# Patient Record
Sex: MALE | HISPANIC OR LATINO
[De-identification: names, ages, dates, MRNs, and addresses within clinical notes are randomized per-mention and may not be internally consistent; named-entity substitution may affect disease eponyms.]

---

## 2018-06-25 ENCOUNTER — CHARTING TRANS (OUTPATIENT)
Dept: OTHER | Age: 38
End: 2018-06-25

## 2018-11-01 VITALS
BODY MASS INDEX: 32.3 KG/M2 | RESPIRATION RATE: 18 BRPM | HEIGHT: 66 IN | SYSTOLIC BLOOD PRESSURE: 122 MMHG | DIASTOLIC BLOOD PRESSURE: 80 MMHG | HEART RATE: 72 BPM | TEMPERATURE: 98.4 F | WEIGHT: 201 LBS

## 2024-08-01 NOTE — PROGRESS NOTES
SUBJECTIVE:  Alejandro Reyes is a 44 year old male who presents for a consultation at the request of, and a copy of this note will be sent to, N/A, for evaluation of  premature ejaculation. He states that the problem is unchanged. Symptoms include chronic history of premature ejaculation well-controlled with paroxetine 20 mg which he takes daily.  Here for a second opinion.. He denies any other complaints.    Allergies: No Known Allergies    History:  No past medical history on file.   No past surgical history on file.   No family history on file.   Social History:   Social History     Socioeconomic History    Marital status:      Social Determinants of Health     Food Insecurity: No Food Insecurity (7/22/2024)    Received from [facility]    Food Insecurity     Within the past 30 days, I worried whether my food would run out before I got money to buy more. / En los últimos 30 días, me preocupó que la comida se podía acabar antes de tener dinero para compr...: Never true / [place]     Within the past 30 days, the food that I bought just didn't last, and I didn't have money to get more. / En los últimos 30 días, La comida que compré no rindió lo suficiente, y no tenía dinero para...: Never true / [place]            REVIEW OF SYSTEMS:  RESPIRATORY:  Negative for chest tightness, wheezing, cough, shortness of breath,  sputum production,chest pain or pleurisy.  CARDIOVASCULAR:  Negative for pain or chest discomfort, dizziness or fainting, palpitations, leg swelling, nocturia, or claudication.  GASTROINTESTINAL:  Negative for nausea, vomiting, diarrhea, constipation, heartburn or indigestion, abdominal pains, bloody or tarry stools.  GENERAL: Denies:  weight gain, weight loss, fever, night sweats, bone pain, malaise, and fatigue. Positive for:  none.  All other review of systems reviewed and otherwise negative    OBJECTIVE:  /90 (BP Location: Right arm, Patient Position: Sitting, Cuff Size:  adult)   Pulse 51   He appears well, in no apparent distress.  Alert and oriented times three, pleasant and cooperative.  CARDIOVASCULAR:normal apical impulse  RESPIRATORY: no chest wall deformities or tenderness  ABDOMEN: abdomen is soft without significant tenderness, masses, organomegaly or guarding  Skin exam grossly normal    ASSESSMENT/PLAN  Encounter Diagnosis   Name Primary?    Premature ejaculation Yes   Reviewed findings with patient.  Suggested taking paroxetine on a as needed basis.  He states he is concerned because he does not know the frequency of intercourse typically.  He will try taking it every other day and see if it continues to be effective.  We agreed that he would follow-up otherwise on a as needed basis.    No orders of the defined types were placed in this encounter.      Meds This Visit:  Requested Prescriptions      No prescriptions requested or ordered in this encounter       Imaging & Referrals:  None